# Patient Record
Sex: MALE | Race: WHITE | NOT HISPANIC OR LATINO | Employment: OTHER | URBAN - METROPOLITAN AREA
[De-identification: names, ages, dates, MRNs, and addresses within clinical notes are randomized per-mention and may not be internally consistent; named-entity substitution may affect disease eponyms.]

---

## 2024-07-22 ENCOUNTER — TRANSFERRED RECORDS (OUTPATIENT)
Dept: HEALTH INFORMATION MANAGEMENT | Facility: CLINIC | Age: 49
End: 2024-07-22

## 2024-08-21 ENCOUNTER — PRE VISIT (OUTPATIENT)
Dept: UROLOGY | Facility: CLINIC | Age: 49
End: 2024-08-21
Payer: COMMERCIAL

## 2024-08-21 NOTE — TELEPHONE ENCOUNTER
"Reason for visit: consult for      Dx/Hx/Sx: hx of \"prostate issues\"     Records/imaging/labs/orders: in epic     At Rooming: standard   "

## 2024-08-30 NOTE — TELEPHONE ENCOUNTER
Action 2024 JTV 10:13 AM    Action Taken CSS sent an urgent request to [facility] for records.     @10:19 AM, Patient confirms [facility]. Patient confirmed   CYSTO, and urine labs, but No imagine and PSA. Patient gave VB OK.     Update 1:18 PM SIMI:  Records from [facility] received and faxed to urgent scanning.    MEDICAL RECORDS REQUEST   [facility]  [address] [place] [id number]  PHONE: [phone]  Fax: [phone]        FUTURE VISIT INFORMATION                                                   Kristofer I Jose, : 1975 scheduled for future visit at [facility]    APPOINTMENT INFORMATION:  Date: 2024  Provider:  Bradley Nova MD  Reason for Visit/Diagnosis: Prostate issues      RECORDS REQUESTED FOR VISIT                                                     NOTES  STATUS/DETAILS   OFFICE NOTE from other specialist MEDIA TAB [facility]   MEDICATION LIST  YES   LABS     URINALYSIS (UA)  YES   IMAGES  no     PRE-VISIT CHECKLIST      Joint diagnostic appointment coordinated correctly          (ensure right order & amount of time) Yes   RECORD COLLECTION COMPLETE YES

## 2024-09-03 ENCOUNTER — LAB (OUTPATIENT)
Dept: LAB | Facility: CLINIC | Age: 49
End: 2024-09-03
Payer: COMMERCIAL

## 2024-09-03 ENCOUNTER — PRE VISIT (OUTPATIENT)
Dept: UROLOGY | Facility: CLINIC | Age: 49
End: 2024-09-03
Payer: COMMERCIAL

## 2024-09-03 ENCOUNTER — OFFICE VISIT (OUTPATIENT)
Dept: UROLOGY | Facility: CLINIC | Age: 49
End: 2024-09-03
Payer: COMMERCIAL

## 2024-09-03 VITALS — DIASTOLIC BLOOD PRESSURE: 94 MMHG | SYSTOLIC BLOOD PRESSURE: 133 MMHG | HEART RATE: 100 BPM

## 2024-09-03 DIAGNOSIS — Z12.5 SCREENING FOR PROSTATE CANCER: ICD-10-CM

## 2024-09-03 DIAGNOSIS — N41.9 PROSTATITIS, UNSPECIFIED PROSTATITIS TYPE: Primary | ICD-10-CM

## 2024-09-03 DIAGNOSIS — N40.1 BPH WITH URINARY OBSTRUCTION: ICD-10-CM

## 2024-09-03 DIAGNOSIS — N13.8 BPH WITH URINARY OBSTRUCTION: ICD-10-CM

## 2024-09-03 LAB — PSA SERPL DL<=0.01 NG/ML-MCNC: 1.6 NG/ML (ref 0–2.5)

## 2024-09-03 PROCEDURE — 36415 COLL VENOUS BLD VENIPUNCTURE: CPT | Performed by: PATHOLOGY

## 2024-09-03 PROCEDURE — 84153 ASSAY OF PSA TOTAL: CPT | Performed by: PATHOLOGY

## 2024-09-03 PROCEDURE — 99203 OFFICE O/P NEW LOW 30 MIN: CPT | Performed by: UROLOGY

## 2024-09-03 RX ORDER — LEVOFLOXACIN 500 MG/1
TABLET, FILM COATED ORAL
COMMUNITY
Start: 2024-07-29 | End: 2024-09-09

## 2024-09-03 RX ORDER — TAMSULOSIN HYDROCHLORIDE 0.4 MG/1
0.4 CAPSULE ORAL DAILY
COMMUNITY
Start: 2024-07-01

## 2024-09-03 RX ORDER — DOXYCYCLINE HYCLATE 100 MG
100 TABLET ORAL 2 TIMES DAILY
Qty: 56 TABLET | Refills: 0 | Status: SHIPPED | OUTPATIENT
Start: 2024-09-03 | End: 2024-10-01

## 2024-09-03 RX ORDER — OFLOXACIN 3 MG/ML
SOLUTION AURICULAR (OTIC)
COMMUNITY
Start: 2023-11-02

## 2024-09-03 ASSESSMENT — PAIN SCALES - GENERAL: PAINLEVEL: NO PAIN (0)

## 2024-09-03 NOTE — PROGRESS NOTES
Chief Complaint   Patient presents with    Consult       Blood pressure (!) 133/94, pulse 100. There is no height or weight on file to calculate BMI.    There is no problem list on file for this patient.      Allergies   Allergen Reactions    Ibuprofen Rash       Current Outpatient Medications   Medication Sig Dispense Refill    levofloxacin (LEVAQUIN) 500 MG tablet TAKE 1 TABLET EVERY 24 HOURS BY ORAL ROUTE      ofloxacin (FLOXIN) 0.3 % otic solution PLACE 10 DROPS INTO LEFT EAR ONCE DAILY FOR 10 DAYS.      tamsulosin (FLOMAX) 0.4 MG capsule Take 0.4 mg by mouth daily.         Social History     Tobacco Use    Smoking status: Never       Ava Cuadra  9/3/2024  12:52 PM

## 2024-09-03 NOTE — Clinical Note
9/3/2024       RE: Kristofer Garcia  [address] [place] [address]     Dear Colleague,    Thank you for referring your patient, Kristofer Garcia, to the [facility] [place] at [facility]. Please see a copy of my visit note below.          Chief Complaint:    {Diagnoses:473464}         History of Present Illness:    Kristofer Garcia is a very pleasant 48 year old male who presents with a history of {Diagnoses:441263}.      ***    Started having some urinary urgency/frequency several months ago. Worse over last 3 months.    Voiding issues. Weak stream, straining to void, urgency, frequency, and nocturia. Has taken antibiotics and had UTI in the past.   ml  Takes tamsulosin    UTI once every 4-5 years.    Took antibiotics levofloxacin x 10 days and all symptoms resolved. Symptoms came back right after stopping medications.  Then took another 7 days, with symptoms improved - but then symptoms came back.    Voiding improved with increased drinking.         Past Medical History:   No past medical history on file.         Past Surgical History:   No past surgical history on file.         Medications     Current Outpatient Medications   Medication Sig Dispense Refill    levofloxacin (LEVAQUIN) 500 MG tablet TAKE 1 TABLET EVERY 24 HOURS BY ORAL ROUTE      ofloxacin (FLOXIN) 0.3 % otic solution PLACE 10 DROPS INTO LEFT EAR ONCE DAILY FOR 10 DAYS.      tamsulosin (FLOMAX) 0.4 MG capsule Take 0.4 mg by mouth daily.       No current facility-administered medications for this visit.          Family History:   No family history on file.         Social History:     Social History     Socioeconomic History    Marital status: Single     Spouse name: Not on file    Number of children: Not on file    Years of education: Not on file    Highest education level: Not on file   Occupational History    Not on file   Tobacco Use    Smoking status: Not on  file    Smokeless tobacco: Not on file   Substance and Sexual Activity    Alcohol use: Not on file    Drug use: Not on file    Sexual activity: Not on file   Other Topics Concern    Not on file   Social History Narrative    Not on file     Social Determinants of Health     Financial Resource Strain: Not on file   Food Insecurity: Not on file   Transportation Needs: Not on file   Physical Activity: Not on file   Stress: Not on file   Social Connections: Not on file   Interpersonal Safety: Not on file   Housing Stability: Not on file            Allergies:   Patient has no allergy information on record.         Review of Systems:  From intake questionnaire     Skin: negative  Eyes: negative  Ears/Nose/Throat: negative  Respiratory: No shortness of breath, dyspnea on exertion, cough, or hemoptysis  Cardiovascular: No chest pain or palpitations  Gastrointestinal: negative; no nausea/vomiting, constipation or diarrhea  Genitourinary: as per HPI  Musculoskeletal: negative  Neurologic: negative  Psychiatric: negative  Hematologic/Lymphatic/Immunologic: negative  Endocrine: negative         Physical Exam:     Patient is a 48 year old  male   Vitals: Blood pressure (!) 133/94, pulse 100.  Constitutional: There is no height or weight on file to calculate BMI.  Alert, no acute distress, oriented, conversant  Eyes: no scleral icterus; extraocular muscles intact, moist conjunctivae  Respiratory: no respiratory distress, or pursed lip breathing  Cardiovascular: pulses strong and intact; no obvious jugular venous distension present  Gastrointestinal: soft, nontender, no organomegaly or masses,   Musculoskeletal: extremities normal, no peripheral edema  Skin: no suspicious lesions or rashes  Neuro: Alert, oriented, speech and mentation normal  Psych: affect and mood normal, alert and oriented to person, place and time      Labs and Pathology:    I reviewed all applicable laboratory and pathology data and went over findings with  "patient  Significant for No results found for: \"CR\"  No results found for: \"PSA\"          Imaging:    The following imaging exams were independently viewed and interpreted by me and discussed with patient:  ***      Outside and Past Medical records:    Assessment requiring an independent historian(s) - {:[id number]}  Review of prior external note(s) from - {note reviewed source:869132}  Review of the result(s) of each unique test - ***         Assessment and Plan:     Assessment:   {Diagnoses:278475}    Plan:  Continue tamsulosin  Cannot take ibuprofen      Orders  No orders of the defined types were placed in this encounter.      Bradley Nova MD  Urology  [facility]            Chief Complaint   Patient presents with    Consult       Blood pressure (!) 133/94, pulse 100. There is no height or weight on file to calculate BMI.    There is no problem list on file for this patient.      Allergies   Allergen Reactions    Ibuprofen Rash       Current Outpatient Medications   Medication Sig Dispense Refill    levofloxacin (LEVAQUIN) 500 MG tablet TAKE 1 TABLET EVERY 24 HOURS BY ORAL ROUTE      ofloxacin (FLOXIN) 0.3 % otic solution PLACE 10 DROPS INTO LEFT EAR ONCE DAILY FOR 10 DAYS.      tamsulosin (FLOMAX) 0.4 MG capsule Take 0.4 mg by mouth daily.         Social History     Tobacco Use    Smoking status: Never       Ava Cuadra  9/3/2024  12:52 PM             Chief Complaint:   Prostate discomfort         History of Present Illness:    Kristofer Garcia is a very pleasant 48 year old male who presents with a history of prostatitis. He has a long history of voiding issues. Weak stream, straining to void, urgency, frequency, and nocturia. Has taken antibiotics and had UTI in the past. UTI once every 4-5 years.  ml in the past. Takes tamsulosin.    More recently, he started having some urinary urgency/frequency several months ago. Worse over last 3 months.    Took antibiotics levofloxacin x " 10 days and all symptoms resolved. Symptoms came back right after stopping medications.  Then took another 7 days, with symptoms improved - but then symptoms came back.    Voiding improved with increased drinking.         Past Medical History:   Prostatitis         Medications     Current Outpatient Medications   Medication Sig Dispense Refill     levofloxacin (LEVAQUIN) 500 MG tablet TAKE 1 TABLET EVERY 24 HOURS BY ORAL ROUTE       ofloxacin (FLOXIN) 0.3 % otic solution PLACE 10 DROPS INTO LEFT EAR ONCE DAILY FOR 10 DAYS.       tamsulosin (FLOMAX) 0.4 MG capsule Take 0.4 mg by mouth daily.       No current facility-administered medications for this visit.          Social History:     Social History     Socioeconomic History     Marital status: Single     Spouse name: Not on file     Number of children: Not on file     Years of education: Not on file     Highest education level: Not on file   Occupational History     Not on file   Tobacco Use     Smoking status: Not on file     Smokeless tobacco: Not on file   Substance and Sexual Activity     Alcohol use: Not on file     Drug use: Not on file     Sexual activity: Not on file   Other Topics Concern     Not on file   Social History Narrative     Not on file     Social Determinants of Health     Financial Resource Strain: Not on file   Food Insecurity: Not on file   Transportation Needs: Not on file   Physical Activity: Not on file   Stress: Not on file   Social Connections: Not on file   Interpersonal Safety: Not on file   Housing Stability: Not on file            Allergies:   Patient has no allergy information on record.         Review of Systems:  From intake questionnaire     Skin: negative  Eyes: negative  Ears/Nose/Throat: negative  Respiratory: No shortness of breath, dyspnea on exertion, cough, or hemoptysis  Cardiovascular: No chest pain or palpitations  Gastrointestinal: negative; no nausea/vomiting, constipation or diarrhea  Genitourinary: as per  HPI  Musculoskeletal: negative  Neurologic: negative  Psychiatric: negative  Hematologic/Lymphatic/Immunologic: negative  Endocrine: negative         Physical Exam:     Patient is a 48 year old  male   Vitals: Blood pressure (!) 133/94, pulse 100.  Constitutional: There is no height or weight on file to calculate BMI.  Alert, no acute distress, oriented, conversant  Eyes: no scleral icterus; extraocular muscles intact, moist conjunctivae  Respiratory: no respiratory distress, or pursed lip breathing  Cardiovascular: pulses strong and intact; no obvious jugular venous distension present  Gastrointestinal: soft, nontender, no organomegaly or masses,   Musculoskeletal: extremities normal, no peripheral edema  Skin: no suspicious lesions or rashes  Neuro: Alert, oriented, speech and mentation normal  Psych: affect and mood normal, alert and oriented to person, place and time      Outside and Past Medical records:    Review of the result(s) of each unique test - PSA         Assessment and Plan:     48 year old male with history of BPH with urinary obstruction, prior UTIs, and recent issues with prostatitis. He would like PSA checked and will send for this today. Regarding his symptoms, we reviewed chronic pelvic pain and causes for prostatitis. He has had symptom resolution previously with antibiotics. Most recently, his courses of antibiotics have only been 7-10 days, which is often not adequate for treatment of prostatitis. As such, will plan longer course of antibiotics for 4 weeks. Will plan doxycycline. We also reviewed role for continued tamsulosin. We discussed that NSAIDs can also help, but he expresses concern about bad previous reaction to ibuprofen, so he does not take this. For now, will try another course of antibiotics and reach out to me with update. Will consider pelvic floor PT if symptoms don't improve.    Plan:  PSA  Continue tamsulosin  Doxycycline x 4 weeks    35 total minutes spent on the date of  the encounter including direct interaction with the patient, performing chart review, documentation and further activities as noted above.    Bradley Nova MD  Urology  [facility]              Again, thank you for allowing me to participate in the care of your patient.      Sincerely,    Bradley Nova MD     negative

## 2024-09-03 NOTE — PROGRESS NOTES
Chief Complaint:   Prostate discomfort         History of Present Illness:    Kristofer Garcia is a very pleasant 48 year old male who presents with a history of prostatitis. He has a long history of voiding issues. Weak stream, straining to void, urgency, frequency, and nocturia. Has taken antibiotics and had UTI in the past. UTI once every 4-5 years.  ml in the past. Takes tamsulosin.    More recently, he started having some urinary urgency/frequency several months ago. Worse over last 3 months.    Took antibiotics levofloxacin x 10 days and all symptoms resolved. Symptoms came back right after stopping medications.  Then took another 7 days, with symptoms improved - but then symptoms came back.    Voiding improved with increased drinking.         Past Medical History:   Prostatitis         Medications     Current Outpatient Medications   Medication Sig Dispense Refill    levofloxacin (LEVAQUIN) 500 MG tablet TAKE 1 TABLET EVERY 24 HOURS BY ORAL ROUTE      ofloxacin (FLOXIN) 0.3 % otic solution PLACE 10 DROPS INTO LEFT EAR ONCE DAILY FOR 10 DAYS.      tamsulosin (FLOMAX) 0.4 MG capsule Take 0.4 mg by mouth daily.       No current facility-administered medications for this visit.          Social History:     Social History     Socioeconomic History    Marital status: Single     Spouse name: Not on file    Number of children: Not on file    Years of education: Not on file    Highest education level: Not on file   Occupational History    Not on file   Tobacco Use    Smoking status: Not on file    Smokeless tobacco: Not on file   Substance and Sexual Activity    Alcohol use: Not on file    Drug use: Not on file    Sexual activity: Not on file   Other Topics Concern    Not on file   Social History Narrative    Not on file     Social Determinants of Health     Financial Resource Strain: Not on file   Food Insecurity: Not on file   Transportation Needs: Not on file   Physical Activity: Not on file    Stress: Not on file   Social Connections: Not on file   Interpersonal Safety: Not on file   Housing Stability: Not on file            Allergies:   Patient has no allergy information on record.         Review of Systems:  From intake questionnaire     Skin: negative  Eyes: negative  Ears/Nose/Throat: negative  Respiratory: No shortness of breath, dyspnea on exertion, cough, or hemoptysis  Cardiovascular: No chest pain or palpitations  Gastrointestinal: negative; no nausea/vomiting, constipation or diarrhea  Genitourinary: as per HPI  Musculoskeletal: negative  Neurologic: negative  Psychiatric: negative  Hematologic/Lymphatic/Immunologic: negative  Endocrine: negative         Physical Exam:     Patient is a 48 year old  male   Vitals: Blood pressure (!) 133/94, pulse 100.  Constitutional: There is no height or weight on file to calculate BMI.  Alert, no acute distress, oriented, conversant  Eyes: no scleral icterus; extraocular muscles intact, moist conjunctivae  Respiratory: no respiratory distress, or pursed lip breathing  Cardiovascular: pulses strong and intact; no obvious jugular venous distension present  Gastrointestinal: soft, nontender, no organomegaly or masses,   Musculoskeletal: extremities normal, no peripheral edema  Skin: no suspicious lesions or rashes  Neuro: Alert, oriented, speech and mentation normal  Psych: affect and mood normal, alert and oriented to person, place and time      Outside and Past Medical records:    Review of the result(s) of each unique test - PSA         Assessment and Plan:     48 year old male with history of BPH with urinary obstruction, prior UTIs, and recent issues with prostatitis. He would like PSA checked and will send for this today. Regarding his symptoms, we reviewed chronic pelvic pain and causes for prostatitis. He has had symptom resolution previously with antibiotics. Most recently, his courses of antibiotics have only been 7-10 days, which is often not  adequate for treatment of prostatitis. As such, will plan longer course of antibiotics for 4 weeks. Will plan doxycycline. We also reviewed role for continued tamsulosin. We discussed that NSAIDs can also help, but he expresses concern about bad previous reaction to ibuprofen, so he does not take this. For now, will try another course of antibiotics and reach out to me with update. Will consider pelvic floor PT if symptoms don't improve.    Plan:  PSA  Continue tamsulosin  Doxycycline x 4 weeks    35 total minutes spent on the date of the encounter including direct interaction with the patient, performing chart review, documentation and further activities as noted above.    Bradley Nova MD  Urology  [facility]

## 2024-09-09 PROBLEM — N40.1 BPH WITH URINARY OBSTRUCTION: Status: ACTIVE | Noted: 2024-09-09

## 2024-09-09 PROBLEM — N13.8 BPH WITH URINARY OBSTRUCTION: Status: ACTIVE | Noted: 2024-09-09

## 2024-10-06 ENCOUNTER — HEALTH MAINTENANCE LETTER (OUTPATIENT)
Age: 49
End: 2024-10-06

## 2024-10-14 DIAGNOSIS — N41.9 PROSTATITIS, UNSPECIFIED PROSTATITIS TYPE: ICD-10-CM

## 2025-01-12 RX ORDER — DOXYCYCLINE HYCLATE 100 MG
100 TABLET ORAL 2 TIMES DAILY
Qty: 56 TABLET | Refills: 0 | OUTPATIENT
Start: 2025-01-12